# Patient Record
Sex: FEMALE | Race: WHITE | Employment: OTHER | URBAN - METROPOLITAN AREA
[De-identification: names, ages, dates, MRNs, and addresses within clinical notes are randomized per-mention and may not be internally consistent; named-entity substitution may affect disease eponyms.]

---

## 2024-10-26 ENCOUNTER — HOSPITAL ENCOUNTER (EMERGENCY)
Age: 74
Discharge: HOME | End: 2024-10-26
Payer: MEDICARE

## 2024-10-26 VITALS
RESPIRATION RATE: 20 BRPM | WEIGHT: 214 LBS | SYSTOLIC BLOOD PRESSURE: 145 MMHG | DIASTOLIC BLOOD PRESSURE: 68 MMHG | BODY MASS INDEX: 39.38 KG/M2 | OXYGEN SATURATION: 96 % | HEIGHT: 62 IN | TEMPERATURE: 98 F | HEART RATE: 62 BPM

## 2024-10-26 DIAGNOSIS — N30.01 ACUTE CYSTITIS WITH HEMATURIA: Primary | ICD-10-CM

## 2024-10-26 LAB
BACTERIA URNS QL MICRO: ABNORMAL
BILIRUB UR QL STRIP: NEGATIVE
CLARITY UR: ABNORMAL
COLOR UR: YELLOW
EPI CELLS #/AREA URNS HPF: ABNORMAL /HPF
GLUCOSE UR STRIP-MCNC: NEGATIVE MG/DL
HGB UR QL STRIP.AUTO: ABNORMAL
KETONES UR STRIP-MCNC: NEGATIVE MG/DL
LEUKOCYTE ESTERASE UR QL STRIP: ABNORMAL
NITRITE UR QL STRIP: POSITIVE
PH UR STRIP: 6 [PH] (ref 5–9)
PROT UR STRIP-MCNC: 30 MG/DL
RBC #/AREA URNS HPF: ABNORMAL /HPF
SP GR UR STRIP: >1.03 (ref 1–1.03)
UROBILINOGEN UR STRIP-ACNC: 0.2 EU/DL (ref 0–1)
WBC #/AREA URNS HPF: ABNORMAL /HPF

## 2024-10-26 PROCEDURE — 81001 URINALYSIS AUTO W/SCOPE: CPT

## 2024-10-26 PROCEDURE — 99211 OFF/OP EST MAY X REQ PHY/QHP: CPT

## 2024-10-26 RX ORDER — LORATADINE 10 MG/1
10 TABLET ORAL DAILY
COMMUNITY

## 2024-10-26 RX ORDER — LEVOTHYROXINE SODIUM 50 UG/1
50 TABLET ORAL DAILY
COMMUNITY

## 2024-10-26 RX ORDER — TIRZEPATIDE 5 MG/.5ML
INJECTION, SOLUTION SUBCUTANEOUS WEEKLY
COMMUNITY

## 2024-10-26 RX ORDER — SULFAMETHOXAZOLE AND TRIMETHOPRIM 800; 160 MG/1; MG/1
1 TABLET ORAL 2 TIMES DAILY
Qty: 14 TABLET | Refills: 0 | Status: SHIPPED | OUTPATIENT
Start: 2024-10-26 | End: 2024-11-02

## 2024-10-26 NOTE — DISCHARGE INSTRUCTIONS
DRINK PLENTY OF FLUIDS  TAKE THE ANTIBIOTICS UNTIL COMPLETED.  FOLLOW UP WITH YOUR PRIMARY DOCTOR ON MONDAY.  RETURN FOR WORSENING SYMPTOMS.

## 2024-10-26 NOTE — ED PROVIDER NOTES
[facility]  Department of Emergency Medicine  Admit Date/RoomTime: 10/26/2024  1:01 PM  ED Room:     NAME: Jess Austin  : 1950  MRN: [id number]     Chief Complaint:  Urinary Tract Infection (Having difficulty urinating, dysuria, hematuria since this am/)    History of Present Illness       Jess Austin is a 74 y.o. old female who presents to the urgent care by private vehicle, for dysuria, hesistency for the past few days.  This morning after she urinated, she noticed her urine was pink tinged.    She denies fevers, chills, nausea, vomiting, shortness of breath, abdominal pain.  She has had UTIs in the past.      ROS   Pertinent positives and negatives are stated within HPI, all other systems reviewed and are negative.    Past Medical History:  has a past medical history of Asthma, Atrial fibrillation (HCC), Diabetes mellitus (HCC), Hyperlipidemia, Hypertension, and Thyroid disease.    Surgical History:  has a past surgical history that includes Hysterectomy; back surgery; Appendectomy; and knee surgery.    Social History:  reports that she has never smoked. She has never used smokeless tobacco. She reports that she does not drink alcohol and does not use drugs.    Family History: family history is not on file.     Allergies: Latex, Shellfish-derived products, Cefaclor, Lisinopril, Rosuvastatin, and Shellfish allergy    Physical Exam   Oxygen Saturation Interpretation: Normal.        ED Triage Vitals [10/26/24 1304]   BP Systolic BP Percentile Diastolic BP Percentile Temp Temp Source Pulse Respirations SpO2   (!) 145/68 -- -- 98 °F (36.7 °C) Infrared 62 20 96 %      Height Weight - Scale         1.58 m (5' 2.21\") 97.1 kg (214 lb)               Constitutional:  Alert, development consistent with age. No acute distress.   HEENT:  NC/NT.  Airway patent.  Neck:  Normal ROM.  Supple.  Respiratory: Respirations regular, nonlabored, no tachypnea.  CV: Skin warm, pink, dry.  GI:  normal appearing,